# Patient Record
Sex: FEMALE | Race: WHITE | Employment: PART TIME | URBAN - METROPOLITAN AREA
[De-identification: names, ages, dates, MRNs, and addresses within clinical notes are randomized per-mention and may not be internally consistent; named-entity substitution may affect disease eponyms.]

---

## 2017-05-24 ENCOUNTER — OFFICE VISIT (OUTPATIENT)
Dept: URGENT CARE | Facility: URGENT CARE | Age: 22
End: 2017-05-24
Payer: COMMERCIAL

## 2017-05-24 VITALS
DIASTOLIC BLOOD PRESSURE: 76 MMHG | HEART RATE: 76 BPM | TEMPERATURE: 98.6 F | SYSTOLIC BLOOD PRESSURE: 126 MMHG | BODY MASS INDEX: 33.61 KG/M2 | WEIGHT: 241 LBS

## 2017-05-24 DIAGNOSIS — H61.23 BILATERAL IMPACTED CERUMEN: Primary | ICD-10-CM

## 2017-05-24 PROCEDURE — 99213 OFFICE O/P EST LOW 20 MIN: CPT | Performed by: PHYSICIAN ASSISTANT

## 2017-05-24 NOTE — MR AVS SNAPSHOT
"              After Visit Summary   2017    Eri Schmitz    MRN: [id number]           Patient Information     Date Of Birth          1995        Visit Information        Provider Department      2017 7:30 PM Susie Evans PA-C [facility]        Today's Diagnoses     Bilateral impacted cerumen    -  1       Follow-ups after your visit        Who to contact     If you have questions or need follow up information about today's clinic visit or your schedule please contact [facility] directly at [phone].  Normal or non-critical lab and imaging results will be communicated to you by MyChart, letter or phone within 4 business days after the clinic has received the results. If you do not hear from us within 7 days, please contact the clinic through [employer] or phone. If you have a critical or abnormal lab result, we will notify you by phone as soon as possible.  Submit refill requests through [employer] or call your pharmacy and they will forward the refill request to us. Please allow 3 business days for your refill to be completed.          Additional Information About Your Visit        MyChart Information     [employer] lets you send messages to your doctor, view your test results, renew your prescriptions, schedule appointments and more. To sign up, go to [email or web address] . Click on \"Log in\" on the left side of the screen, which will take you to the Welcome page. Then click on \"Sign up Now\" on the right side of the page.     You will be asked to enter the access code listed below, as well as some personal information. Please follow the directions to create your username and password.     Your access code is: [email or web address]  Expires: 2017  8:19 PM     Your access code will  in 90 days. If you need help or a new code, please call your [place] clinic or [phone].        Care EveryWhere ID     This is your Care " EveryWhere ID. This could be used by other organizations to access your [place] medical records  [id number]        Your Vitals Were     Pulse Temperature BMI (Body Mass Index)             76 98.6  F (37  C) (Oral) 33.61 kg/m2          Blood Pressure from Last 3 Encounters:   05/24/17 126/76   08/08/16 148/90   08/03/16 130/82    Weight from Last 3 Encounters:   05/24/17 241 lb (109.3 kg)   08/08/16 211 lb (95.7 kg)   08/03/16 210 lb (95.3 kg)              Today, you had the following     No orders found for display       Primary Care Provider    Marie Renee MD       XXX RETIRED XXX XXX  XXX [place] [address]        Thank you!     Thank you for choosing [facility]  for your care. Our goal is always to provide you with excellent care. Hearing back from our patients is one way we can continue to improve our services. Please take a few minutes to complete the written survey that you may receive in the mail after your visit with us. Thank you!             Your Updated Medication List - Protect others around you: Learn how to safely use, store and throw away your medicines at www.disposemymeds.org.          This list is accurate as of: 5/24/17  8:19 PM.  Always use your most recent med list.                   Brand Name Dispense Instructions for use    ELAVIL PO          ibuprofen 200 MG tablet    ADVIL/MOTRIN    1 tablet    Take 3 tablets by mouth every 8 hours as needed for pain.       IMITREX 50 MG tablet   Generic drug:  SUMAtriptan      Take by mouth at onset of headache for migraine       levonorgestrel 20 MCG/24HR IUD    MIRENA (52 MG)    1 each    1 each (20 mcg) by Intrauterine route once for 1 dose       MULTIVITAMIN PO          venlafaxine 150 MG 24 hr capsule    EFFEXOR-XR     TK 1 C PO QD

## 2017-05-25 NOTE — NURSING NOTE
"Chief Complaint   Patient presents with     Ear Problem     rt ear plugged for 3 days       Initial /76 (BP Location: Right arm, Patient Position: Chair, Cuff Size: Adult Large)  Pulse 76  Temp 98.6  F (37  C) (Oral)  Wt 241 lb (109.3 kg)  BMI 33.61 kg/m2 Estimated body mass index is 33.61 kg/(m^2) as calculated from the following:    Height as of 8/8/16: 5' 11\" (1.803 m).    Weight as of this encounter: 241 lb (109.3 kg).  Medication Reconciliation: complete   Boby GIFFORD    "

## 2017-05-25 NOTE — PROGRESS NOTES
SUBJECTIVE:  Eri Schmitz is a 22 year old female who presents with bilateral ear fullness for 3 day(s).   Severity: moderate   Timing:gradual onset  Additional symptoms include none.      History of recurrent otitis: no    Past Medical History:   Diagnosis Date     Depression, major, recurrent, moderate (H)     psych, effexor.      Dysthymia     self managed well with exercise, seeing friends. Started Effexor 7/2013. PMD     Migraines     No aura. Improved with ocps. amitriptyline for suppression.      Current Outpatient Prescriptions   Medication Sig Dispense Refill     venlafaxine (EFFEXOR-XR) 150 MG 24 hr capsule TK 1 C PO QD  3     levonorgestrel (MIRENA, 52 MG,) 20 MCG/24HR IUD 1 each (20 mcg) by Intrauterine route once for 1 dose 1 each 0     SUMAtriptan (IMITREX) 50 MG tablet Take by mouth at onset of headache for migraine       Amitriptyline HCl (ELAVIL PO)        Multiple Vitamins-Minerals (MULTIVITAMIN PO)        ibuprofen (ADVIL,MOTRIN) 200 MG tablet Take 3 tablets by mouth every 8 hours as needed for pain. (Patient not taking: Reported on 5/24/2017) 1 tablet 0     Social History   Substance Use Topics     Smoking status: Never Smoker     Smokeless tobacco: Never Used     Alcohol use Not on file       ROS:   CONSTITUTIONAL:NEGATIVE for fever, chills, change in weight  INTEGUMENTARY/SKIN: NEGATIVE for worrisome rashes, moles or lesions  ENT/MOUTH: as above    OBJECTIVE:  /76 (BP Location: Right arm, Patient Position: Chair, Cuff Size: Adult Large)  Pulse 76  Temp 98.6  F (37  C) (Oral)  Wt 241 lb (109.3 kg)  BMI 33.61 kg/m2   EXAM:  Initially both ear canals are blocked with cerumen.    After lavage by nursing the exam is as follows:  The right TM is normal: no effusions, no erythema, and normal landmarks     The right auditory canal is normal and without drainage, edema or erythema  The left TM is normal: no effusions, no erythema, and normal landmarks  The left auditory canal is normal and  without drainage, edema or erythema  SKIN: no rashes or lesions    (H61.23) Bilateral impacted cerumen  (primary encounter diagnosis)  Comment:   Plan: resolved with lavage by nursing.

## 2017-12-27 ENCOUNTER — TRANSFERRED RECORDS (OUTPATIENT)
Dept: HEALTH INFORMATION MANAGEMENT | Facility: CLINIC | Age: 22
End: 2017-12-27

## 2018-01-03 ENCOUNTER — TRANSFERRED RECORDS (OUTPATIENT)
Dept: HEALTH INFORMATION MANAGEMENT | Facility: CLINIC | Age: 23
End: 2018-01-03

## 2018-01-09 ENCOUNTER — MEDICAL CORRESPONDENCE (OUTPATIENT)
Dept: HEALTH INFORMATION MANAGEMENT | Facility: CLINIC | Age: 23
End: 2018-01-09

## 2018-01-31 ENCOUNTER — OFFICE VISIT (OUTPATIENT)
Dept: OPHTHALMOLOGY | Facility: CLINIC | Age: 23
End: 2018-01-31
Attending: OPHTHALMOLOGY
Payer: COMMERCIAL

## 2018-01-31 DIAGNOSIS — H47.333 PSEUDOPAPILLEDEMA OF BOTH OPTIC DISCS: Primary | ICD-10-CM

## 2018-01-31 PROCEDURE — 92083 EXTENDED VISUAL FIELD XM: CPT | Mod: ZF | Performed by: OPHTHALMOLOGY

## 2018-01-31 PROCEDURE — G0463 HOSPITAL OUTPT CLINIC VISIT: HCPCS | Mod: ZF

## 2018-01-31 ASSESSMENT — SLIT LAMP EXAM - LIDS
COMMENTS: NORMAL
COMMENTS: NORMAL

## 2018-01-31 ASSESSMENT — TONOMETRY
OD_IOP_MMHG: 15
OS_IOP_MMHG: 17
IOP_METHOD: ICARE

## 2018-01-31 ASSESSMENT — VISUAL ACUITY
OS_CC: 20/20
METHOD: SNELLEN - LINEAR
OD_CC: 20/20
CORRECTION_TYPE: GLASSES

## 2018-01-31 ASSESSMENT — EXTERNAL EXAM - LEFT EYE: OS_EXAM: NORMAL

## 2018-01-31 ASSESSMENT — REFRACTION_WEARINGRX
OD_AXIS: 076
OD_CYLINDER: +0.50
OS_CYLINDER: +0.25
OD_SPHERE: -4.00
OS_SPHERE: -4.25
OS_AXIS: 103

## 2018-01-31 ASSESSMENT — CUP TO DISC RATIO
OS_RATIO: 0.2
OD_RATIO: 0.2

## 2018-01-31 ASSESSMENT — CONF VISUAL FIELD
OS_NORMAL: 1
OD_NORMAL: 1

## 2018-01-31 ASSESSMENT — EXTERNAL EXAM - RIGHT EYE: OD_EXAM: NORMAL

## 2018-01-31 NOTE — LETTER
2018    RE: Eri Schmitz  : 1995  MRN: [id number]    Dear Dr. Najera,    Thank you for referring your patient, Eri Schmitz, to my neuro-ophthalmology clinic recently.  After a thorough neuro-ophthalmic history and examination, I came to the following conclusions:     1. 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  I agree with Dr. Navarrete that no further workup is required at this time and patient may follow-up with Dr. Najera in 1 year for repeat exam:    Eri Schmitz is a pleasant 23 year old White female who presents to my neuro-ophthalmology clinic today for a second opinion on optic disc elevation.     1 month ago, patient was being seen by regular ophthalmologist (Dr. Najera) who noted an elevation in her optic discs bilaterally. He referred her to a retina specialist (Dr. Augustine Navarrete) who was suspicious for congenital drusen but wanted her to see a neuro-ophthalmologist to confirm. Last eye exam 3-4 years prior had showed no abnormal findings.    Eri has not noticed any changes in her vision recently. No new flashes, floaters, shadows, loss of color vision, visual distortions, or deficits in her vision. No pain in her eyes or pain with eye movements. No redness or swelling. No new headaches, weight loss, weight gain, numbness/tingling, weakness, fevers, chills, or other symptoms. She does not recall any other history of optic disc abnormalities.     PMHx: Depression and Anxiety. Migraines.   Surgical: None     Medications: Venlafaxine   FHx: Father and maternal grandmother with cataracts. No family history of ocular disease, ocular surgery, neurological disease, or cancer.    SHx: Gap year before graduate school. Interested in forensic anthropology and  archaeology.      The patient has normal afferent visual function in both eyes including normal best corrected visual acuity, color vision, confrontation visual fields, and pupillary light responses in both eyes.  Slit lamp exam was unremarkable. Dilated fundus exam revealed nasal elevation of the optic nerve head in both eyes.  There was mild blurring of the nasal margin however the retinal vessels were still visible at the nasal margin.  Spontaneous venous pulsations were clearly detected in both eyes with direct ophthalmoscopy. Cranial nerves V1-3, 7,8,9,11, and 12 were normal bilaterally.     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  It is possible that this represents buried optic nerve head drusen or also possible that she simply has anomalous elevated nerves in the absence of drusen.  It is reassuring today that she has no symptoms of increased intracranial pressure and spontaneous venous pulsations present at both optic nerve heads which is a very strong argument against increased intracranial pressure.    I did not make a follow-up appointment, but I would be happy to see the patient back in the future should any new neuro-ophthalmic concern arise.  I recommended that she follow-up with Dr. Najera in the future for her primary eye care.      Again, thank you for trusting me with the care of your patient.  For further exam details, please feel free to contact our office for additional records.  If you wish to contact me regarding this patient please email me at [email or web address] or give my clinic a call to arrange a phone conversation.    Sincerely,    Tarun Christensen MD  , Neuro-Ophthalmology and Adult Strabismus  Department of Ophthalmology and Visual Neurosciences  [facility]    DX: pseudo-papilledema

## 2018-01-31 NOTE — MR AVS SNAPSHOT
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    -  1       Follow-ups after your visit        Who to contact     Please call your clinic at [phone] to:    Ask questions about your health    Make or cancel appointments    Discuss your medicines    Learn about your test results    Speak to your doctor   If you have compliments or concerns about an experience at your clinic, or if you wish to file a complaint, please contact [facility] Patient Relations at [phone] or email us at [email or web address]         Additional Information About Your Visit        MyChart Information     [employer] is an electronic gateway that provides easy, online access to your medical records. With [employer], you can request a clinic appointment, read your test results, renew a prescription or communicate with your care team.     To sign up for [employer] visit the website at [email or web address]   You will be asked to enter the access code listed below, as well as some personal information. Please follow the directions to create your username and password.     Your access code is: [email or web address]  Expires: 2018  6:31 AM     Your access code will  in 90 days. If you need help or a new code, please contact your [facility] or call [phone] for assistance.        Care EveryWhere ID     This is your Care EveryWhere ID. This could be used by other organizations to access your [place] medical records  [id number]         Blood Pressure from Last 3 Encounters:   17 126/76   16 148/90   16 130/82    Weight from Last 3 Encounters:   17  109.3 kg (241 lb)   08/08/16 95.7 kg (211 lb)   08/03/16 95.3 kg (210 lb)              We Performed the Following     Glaucoma Top OU          Today's Medication Changes          These changes are accurate as of 1/31/18 11:59 PM.  If you have any questions, ask your nurse or doctor.               Stop taking these medicines if you haven't already. Please contact your care team if you have questions.     ELAVIL PO   Stopped by:  Tarun Christensen MD           IMITREX 50 MG tablet   Generic drug:  SUMAtriptan   Stopped by:  Tarun Christensen MD                    Primary Care Provider    Marie Renee MD       XXX RETIRED XXX XXX  XXX [place] [address]        Equal Access to Services     [facility]: Hadii guillermo holguin hadhenryo Soadwoa, waaxda luqadaha, qaybta kaalmada adequentinyada, alvaro bliss . So [facility] [phone].    ATENCIÓN: Si habla español, tiene a nicolas disposición servicios gratuitos de asistencia lingüística. Llame al [phone].    We comply with applicable federal civil rights laws and [place] laws. We do not discriminate on the basis of race, color, national origin, age, disability, sex, sexual orientation, or gender identity.            Thank you!     Thank you for choosing [facility]  for your care. Our goal is always to provide you with excellent care. Hearing back from our patients is one way we can continue to improve our services. Please take a few minutes to complete the written survey that you may receive in the mail after your visit with us. Thank you!             Your Updated Medication List - Protect others around you: Learn how to safely use, store and throw away your medicines at www.disposemymeds.org.          This list is accurate as of 1/31/18 11:59 PM.  Always use your most recent med list.                   Brand Name Dispense Instructions for use Diagnosis    ibuprofen 200 MG tablet    ADVIL/MOTRIN    1 tablet    Take 3 tablets by mouth every 8  hours as needed for pain.        levonorgestrel 20 MCG/24HR IUD    MIRENA (52 MG)    1 each    1 each (20 mcg) by Intrauterine route once for 1 dose    Encounter for IUD insertion       MULTIVITAMIN PO           venlafaxine 150 MG 24 hr capsule    EFFEXOR-XR     TK 1 C PO QD

## 2018-01-31 NOTE — PROGRESS NOTES
ATTENDING ASSESSMENT AND PLAN:       1. 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  I agree with Dr. Navarrete that no further workup is required at this time and patient may follow-up with Dr. Najera in 1 year for repeat exam:    Eri Schmitz is a pleasant 23 year old White female who presents to my neuro-ophthalmology clinic today for a second opinion on optic disc elevation.     1 month ago, patient was being seen by regular ophthalmologist (Dr. Najera) who noted an elevation in her optic discs bilaterally. He referred her to a retina specialist (Dr. Augustine Navarrete) who was suspicious for congenital drusen but wanted her to see a neuro-ophthalmologist to confirm. Last eye exam 3-4 years prior had showed no abnormal findings.    Eri has not noticed any changes in her vision recently. No new flashes, floaters, shadows, loss of color vision, visual distortions, or deficits in her vision. No pain in her eyes or pain with eye movements. No redness or swelling. No new headaches, weight loss, weight gain, numbness/tingling, weakness, fevers, chills, or other symptoms. She does not recall any other history of optic disc abnormalities.     PMHx: Depression and Anxiety. Migraines.   Surgical: None     Medications: Venlafaxine   FHx: Father and maternal grandmother with cataracts. No family history of ocular disease, ocular surgery, neurological disease, or cancer.    SHx: Gap year before graduate school. Interested in forensic anthropology and archaeology.      The patient has normal afferent visual function in both eyes including normal best corrected visual acuity, color vision, confrontation visual fields, and pupillary light responses in both eyes.  Slit lamp exam was unremarkable. Dilated fundus  exam revealed nasal elevation of the optic nerve head in both eyes.  There was mild blurring of the nasal margin however the retinal vessels were still visible at the nasal margin.  Spontaneous venous pulsations were clearly detected in both eyes with direct ophthalmoscopy. Cranial nerves V1-3, 7,8,9,11, and 12 were normal bilaterally.     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  It is possible that this represents buried optic nerve head drusen or also possible that she simply has anomalous elevated nerves in the absence of drusen.  It is reassuring today that she has no symptoms of increased intracranial pressure and spontaneous venous pulsations present at both optic nerve heads which is a very strong argument against increased intracranial pressure.    I did not make a follow-up appointment, but I would be happy to see the patient back in the future should any new neuro-ophthalmic concern arise.  I recommended that she follow-up with Dr. Najera in the future for her primary eye care.         Complete documentation of historical and exam elements from today's encounter can be found in the full encounter summary report (not reduplicated in this progress note).  I personally obtained the chief complaint(s) and history of present illness.  I confirmed and edited as necessary the review of systems, past medical/surgical history, family history, social history, and examination findings as documented by others; and I examined the patient myself.  I personally reviewed the relevant tests, images, and reports as documented above.  I formulated and edited as necessary the assessment and plan and discussed the findings and management plan with the patient and family   Tarun Christensen MD

## 2018-01-31 NOTE — NURSING NOTE
Chief Complaints and History of Present Illnesses   Patient presents with     Neurologic Problem     elevated optic disc     HPI    Symptoms:              Comments:  Eri is a 23 year old female referred to rule out papilledema vs. buried optic nerve drusen.     No ocular symptoms  No headaches  No tinnitus  No vision concerns  Current Rx 1 month old.     Cecy VELA 1:45 PM January 31, 2018

## 2018-02-15 ENCOUNTER — OFFICE VISIT (OUTPATIENT)
Dept: URGENT CARE | Facility: URGENT CARE | Age: 23
End: 2018-02-15
Payer: COMMERCIAL

## 2018-02-15 VITALS
TEMPERATURE: 99 F | BODY MASS INDEX: 26.6 KG/M2 | WEIGHT: 190 LBS | DIASTOLIC BLOOD PRESSURE: 82 MMHG | SYSTOLIC BLOOD PRESSURE: 140 MMHG | HEART RATE: 100 BPM | HEIGHT: 71 IN

## 2018-02-15 DIAGNOSIS — H61.23 EXCESSIVE EAR WAX, BILATERAL: Primary | ICD-10-CM

## 2018-02-15 PROCEDURE — 69209 REMOVE IMPACTED EAR WAX UNI: CPT | Mod: 50 | Performed by: FAMILY MEDICINE

## 2018-02-15 NOTE — MR AVS SNAPSHOT
After Visit Summary   2/15/2018    Eri Schmitz    MRN: [id number]           Patient Information     Date Of Birth          1995        Visit Information        Provider Department      2/15/2018 6:55 PM Danielle Giang MD [facility]        Today's Diagnoses     Excessive ear wax, bilateral    -  1      Care Instructions      Earwax Removal    The ear canal makes earwax from the canal s lining. The ears make wax to lubricate and protect the ear canal. The ear canal is the tube that connects the middle ear to the outside of the ear. The wax protects the ear from bacteria, infection, and damage from water or trauma.  The wax that forms in the canal naturally moves toward the outside of the ear and falls out. In some cases, the ear may make too much wax. If the wax causes problems or keeps the healthcare provider from seeing into the ear, the extra wax may be removed.  Too much wax can affect your hearing. It can cause itching. In rare cases, it can be painful. Earwax should not be removed unless it is causing a problem. You should not stick objects into your ear to remove wax unless told to do so by your healthcare provider.  Healthcare providers can remove earwax safely. It is important to stay still during the procedure to avoid damage to the ear canal. But removing earwax generally doesn t hurt. You will not usually need anesthesia or pain medicine when the provider removes the earwax.  A number of conditions lead to earwax buildup. These include some skin problems, a narrow ear canal, or ears that make too much earwax. Using cotton swabs in the canal pushes earwax deeper into the ear and contributes to the buildup of earwax.  Home care    The healthcare provider may recommend mineral oil or an over-the-counter eardrop to use at home to soften the earwax. Use these products only if the provider recommends them. Carefully follow the instructions  given.    Don t use mineral oil or OTC eardrops if you might have an ear infection or a ruptured eardrum. Tell your healthcare provider right away if you have diabetes or an immune disorder.    Don t use cotton swabs in your ears. Cotton swabs may push wax deeper into the ear canal or damage the eardrum. Use cotton gauze or a wet washcloth  to gently remove wax on the outside of the ear and around the opening to the ear canal.    Don't use any probing device or object such as cotton-tipped swabs or sonia pins to clean the inside of your ears.    Don t use ear candles to clean your ears. Candling can be dangerous. It can burn the ear canal. It can also make the condition worse instead of better.    Don t use cold water to rinse the ear. This will make you dizzy. If your provider tells you to rinse your ear, use only warm water or follow his or her instructions.    Check the ear for signs of infection or irritation listed below under When to seek medical advice.  Steps for using eardrops  1. Warm the medicine bottle by rubbing it between your hands for a few minutes.  2. Lie down on your side, with the affected ear up.  3. Place the recommended number of drops in the ear. Wet a cotton ball with the medicine. Gently put the cotton ball into the ear opening.  Follow-up care  Follow up with your healthcare provider, or as directed.  When to seek medical advice  Call the provider right away if you have:    Ear pain that gets worse    Fever of 100.4F F (38 C) or higher, or as directed by your healthcare provider    Worsening wax buildup    Severe pain, dizziness, or nausea    Bleeding from the ear    Hearing problems    Signs of irritation from the eardrops, such as burning, stinging, or swelling and tenderness    Foul-smelling fluid draining from the ear    Swelling, redness, or tenderness of the outer ear    Headache, neck pain, or stiff neck  Date Last Reviewed: 6/1/2017 2000-2017 [employer] [address]  "[place] [address]. All rights reserved. This information is not intended as a substitute for professional medical care. Always follow your healthcare professional's instructions.                Follow-ups after your visit        Follow-up notes from your care team     Return if symptoms worsen or fail to improve.      Who to contact     If you have questions or need follow up information about today's clinic visit or your schedule please contact [facility] directly at [phone].  Normal or non-critical lab and imaging results will be communicated to you by MyChart, letter or phone within 4 business days after the clinic has received the results. If you do not hear from us within 7 days, please contact the clinic through FONU2hart or phone. If you have a critical or abnormal lab result, we will notify you by phone as soon as possible.  Submit refill requests through Craneware or call your pharmacy and they will forward the refill request to us. Please allow 3 business days for your refill to be completed.          Additional Information About Your Visit        FONU2hart Information     Craneware lets you send messages to your doctor, view your test results, renew your prescriptions, schedule appointments and more. To sign up, go to [email or web address] . Click on \"Log in\" on the left side of the screen, which will take you to the Welcome page. Then click on \"Sign up Now\" on the right side of the page.     You will be asked to enter the access code listed below, as well as some personal information. Please follow the directions to create your username and password.     Your access code is: [email or web address]  Expires: 2018  6:31 AM     Your access code will  in 90 days. If you need help or a new code, please call your [facility] or [phone].        Care EveryWhere ID     This is your Care EveryWhere ID. This could be used by other organizations to access your " "[place] medical records  [id number]        Your Vitals Were     Pulse Temperature Height BMI (Body Mass Index)          100 99  F (37.2  C) 5' 11\" (1.803 m) 26.5 kg/m2         Blood Pressure from Last 3 Encounters:   02/15/18 140/82   05/24/17 126/76   08/08/16 148/90    Weight from Last 3 Encounters:   02/15/18 190 lb (86.2 kg)   05/24/17 241 lb (109.3 kg)   08/08/16 211 lb (95.7 kg)              We Performed the Following     HC REMOVE IMPACTED CERUMEN, BILATERAL        Primary Care Provider Fax #    Provider Not In System [phone]                Equal Access to Services     WESLEY TOSCANO : Davidson Brush, donna mccormick, briseyda neff, alvaro bliss . So [facility] [phone].    ATENCIÓN: Si habla español, tiene a nicolas disposición servicios gratuitos de asistencia lingüística. Llame al [phone].    We comply with applicable federal civil rights laws and [place] laws. We do not discriminate on the basis of race, color, national origin, age, disability, sex, sexual orientation, or gender identity.            Thank you!     Thank you for choosing [facility]  for your care. Our goal is always to provide you with excellent care. Hearing back from our patients is one way we can continue to improve our services. Please take a few minutes to complete the written survey that you may receive in the mail after your visit with us. Thank you!             Your Updated Medication List - Protect others around you: Learn how to safely use, store and throw away your medicines at www.disposemymeds.org.          This list is accurate as of 2/15/18  7:53 PM.  Always use your most recent med list.                   Brand Name Dispense Instructions for use Diagnosis    ibuprofen 200 MG tablet    ADVIL/MOTRIN    1 tablet    Take 3 tablets by mouth every 8 hours as needed for pain.        levonorgestrel 20 MCG/24HR IUD    MIRENA (52 MG)    1 each    1 " each (20 mcg) by Intrauterine route once for 1 dose    Encounter for IUD insertion       MULTIVITAMIN PO           venlafaxine 150 MG 24 hr capsule    EFFEXOR-XR     TK 1 C PO QD

## 2018-02-16 NOTE — PATIENT INSTRUCTIONS
Earwax Removal    The ear canal makes earwax from the canal s lining. The ears make wax to lubricate and protect the ear canal. The ear canal is the tube that connects the middle ear to the outside of the ear. The wax protects the ear from bacteria, infection, and damage from water or trauma.  The wax that forms in the canal naturally moves toward the outside of the ear and falls out. In some cases, the ear may make too much wax. If the wax causes problems or keeps the healthcare provider from seeing into the ear, the extra wax may be removed.  Too much wax can affect your hearing. It can cause itching. In rare cases, it can be painful. Earwax should not be removed unless it is causing a problem. You should not stick objects into your ear to remove wax unless told to do so by your healthcare provider.  Healthcare providers can remove earwax safely. It is important to stay still during the procedure to avoid damage to the ear canal. But removing earwax generally doesn t hurt. You will not usually need anesthesia or pain medicine when the provider removes the earwax.  A number of conditions lead to earwax buildup. These include some skin problems, a narrow ear canal, or ears that make too much earwax. Using cotton swabs in the canal pushes earwax deeper into the ear and contributes to the buildup of earwax.  Home care    The healthcare provider may recommend mineral oil or an over-the-counter eardrop to use at home to soften the earwax. Use these products only if the provider recommends them. Carefully follow the instructions given.    Don t use mineral oil or OTC eardrops if you might have an ear infection or a ruptured eardrum. Tell your healthcare provider right away if you have diabetes or an immune disorder.    Don t use cotton swabs in your ears. Cotton swabs may push wax deeper into the ear canal or damage the eardrum. Use cotton gauze or a wet washcloth  to gently remove wax on the outside of the ear and  around the opening to the ear canal.    Don't use any probing device or object such as cotton-tipped swabs or sonia pins to clean the inside of your ears.    Don t use ear candles to clean your ears. Candling can be dangerous. It can burn the ear canal. It can also make the condition worse instead of better.    Don t use cold water to rinse the ear. This will make you dizzy. If your provider tells you to rinse your ear, use only warm water or follow his or her instructions.    Check the ear for signs of infection or irritation listed below under When to seek medical advice.  Steps for using eardrops  1. Warm the medicine bottle by rubbing it between your hands for a few minutes.  2. Lie down on your side, with the affected ear up.  3. Place the recommended number of drops in the ear. Wet a cotton ball with the medicine. Gently put the cotton ball into the ear opening.  Follow-up care  Follow up with your healthcare provider, or as directed.  When to seek medical advice  Call the provider right away if you have:    Ear pain that gets worse    Fever of 100.4F F (38 C) or higher, or as directed by your healthcare provider    Worsening wax buildup    Severe pain, dizziness, or nausea    Bleeding from the ear    Hearing problems    Signs of irritation from the eardrops, such as burning, stinging, or swelling and tenderness    Foul-smelling fluid draining from the ear    Swelling, redness, or tenderness of the outer ear    Headache, neck pain, or stiff neck  Date Last Reviewed: 6/1/2017 2000-2017 [employer]. [address] [place] [address], [place] [address]. All rights reserved. This information is not intended as a substitute for professional medical care. Always follow your healthcare professional's instructions.

## 2018-02-16 NOTE — PROGRESS NOTES
"SUBJECTIVE:  Eri Schmitz is a 23 year old with R>L waxy ears and here for ear wash. Feels ears are plugged up.     OBJECTIVE:  /82  Pulse 100  Temp 99  F (37.2  C)  Ht 5' 11\" (1.803 m)  Wt 190 lb (86.2 kg)  BMI 26.5 kg/m2  General appearance: alert and cooperative.    Ears: abnormal: R TM not visualized secondary to cerumen; L TM not visualized secondary to cerumen.  I tried to remove some wax from the right ear and some did come out with ear curette but not fully.     ASSESSMENT:  Bilateral ear wax impaction s/p ear lavage    PLAN: Ear check by nurse and all wax was out. Call or return to clinic prn if these symptoms fail to improve as anticipated.  Danielle Gusman MD   "

## 2018-02-16 NOTE — NURSING NOTE
"Chief Complaint   Patient presents with     Urgent Care     Ent Problem       Initial /82  Pulse 100  Temp 99  F (37.2  C)  Ht 5' 11\" (1.803 m)  Wt 190 lb (86.2 kg)  BMI 26.5 kg/m2 Estimated body mass index is 26.5 kg/(m^2) as calculated from the following:    Height as of this encounter: 5' 11\" (1.803 m).    Weight as of this encounter: 190 lb (86.2 kg).  Medication Reconciliation: complete    "

## 2018-06-12 ENCOUNTER — OFFICE VISIT (OUTPATIENT)
Dept: URGENT CARE | Facility: URGENT CARE | Age: 23
End: 2018-06-12
Payer: COMMERCIAL

## 2018-06-12 VITALS
SYSTOLIC BLOOD PRESSURE: 111 MMHG | HEART RATE: 70 BPM | TEMPERATURE: 97.6 F | DIASTOLIC BLOOD PRESSURE: 73 MMHG | WEIGHT: 243.3 LBS | OXYGEN SATURATION: 95 % | BODY MASS INDEX: 33.93 KG/M2

## 2018-06-12 DIAGNOSIS — H61.23 BILATERAL IMPACTED CERUMEN: Primary | ICD-10-CM

## 2018-06-12 DIAGNOSIS — H65.192 OTHER ACUTE NONSUPPURATIVE OTITIS MEDIA OF LEFT EAR, RECURRENCE NOT SPECIFIED: ICD-10-CM

## 2018-06-12 PROCEDURE — 99213 OFFICE O/P EST LOW 20 MIN: CPT | Mod: 25 | Performed by: PHYSICIAN ASSISTANT

## 2018-06-12 PROCEDURE — 69210 REMOVE IMPACTED EAR WAX UNI: CPT | Mod: 50 | Performed by: PHYSICIAN ASSISTANT

## 2018-06-12 RX ORDER — AMOXICILLIN 500 MG/1
500 CAPSULE ORAL 2 TIMES DAILY
Qty: 20 CAPSULE | Refills: 0 | Status: SHIPPED | OUTPATIENT
Start: 2018-06-12 | End: 2018-06-22

## 2018-06-12 ASSESSMENT — ENCOUNTER SYMPTOMS
NAUSEA: 0
ABDOMINAL PAIN: 0
FEVER: 0
COUGH: 0
DIARRHEA: 0
HEADACHES: 0
CHILLS: 0
VOMITING: 0
SHORTNESS OF BREATH: 0
FOCAL WEAKNESS: 0

## 2018-06-12 ASSESSMENT — PAIN SCALES - GENERAL: PAINLEVEL: MILD PAIN (2)

## 2018-06-12 NOTE — PROGRESS NOTES
HPI  June 12, 2018    HPI: Eri Schmitz is a 23 year old female who complains of moderate L ear pain & fullness/decreased hearing onset yesterday. Symptoms are constant in duration. No treatments tried. Denies cough, congestion, ear drainage, fever/chills, HA, CP, SOB, abd pain, N/V/D, rash, or any other symptoms.     Past Medical History:   Diagnosis Date     Depression, major, recurrent, moderate (H)     psych, effexor.      Dysthymia     self managed well with exercise, seeing friends. Started Effexor 7/2013. PMD     Migraines     No aura. Improved with ocps. amitriptyline for suppression.      Past Surgical History:   Procedure Laterality Date     NO HISTORY OF SURGERY       Social History   Substance Use Topics     Smoking status: Never Smoker     Smokeless tobacco: Never Used     Alcohol use Not on file     Patient Active Problem List   Diagnosis     Depression, major, recurrent, moderate (H)     Intractable migraine without aura and with status migrainosus     Family History   Problem Relation Age of Onset     DIABETES Mother      DIABETES Maternal Grandfather      Lung Cancer Paternal Grandmother         Problem list, Medication list, Allergies, and Medical/Social/Surgical histories reviewed in [facility] and updated as appropriate.      Review of Systems   Constitutional: Negative for chills and fever.   HENT: Positive for ear pain. Negative for congestion and ear discharge.    Respiratory: Negative for cough and shortness of breath.    Cardiovascular: Negative for chest pain.   Gastrointestinal: Negative for abdominal pain, diarrhea, nausea and vomiting.   Skin: Negative for rash.   Neurological: Negative for focal weakness and headaches.   All other systems reviewed and are negative.        Physical Exam   Constitutional: She is oriented to person, place, and time and well-developed, well-nourished, and in no distress.   HENT:   Head: Normocephalic and atraumatic.   Left Ear: No drainage.   Nose: Nose  normal.   Mouth/Throat: Uvula is midline, oropharynx is clear and moist and mucous membranes are normal.   Exam before irrigation: cerumen impaction bilaterally, unable to visualize TMs    Exam after irrigation: cerumen impaction resolved bilaterally, right TM normal, left TM erythematous & bulging   Cardiovascular: Normal rate, regular rhythm and normal heart sounds.    Pulmonary/Chest: Effort normal and breath sounds normal.   Musculoskeletal: Normal range of motion.   Neurological: She is alert and oriented to person, place, and time. Gait normal.   Skin: Skin is warm and dry.   Nursing note and vitals reviewed.    Vital Signs  /73 (BP Location: Left arm, Patient Position: Sitting, Cuff Size: Adult Large)  Pulse 70  Temp 97.6  F (36.4  C) (Tympanic)  Wt 243 lb 4.8 oz (110.4 kg)  SpO2 95%  BMI 33.93 kg/m2     Diagnostic Test Results:  none     ASSESSMENT/PLAN      ICD-10-CM    1. Bilateral impacted cerumen H61.23 REMOVE IMPACTED CERUMEN   2. Other acute nonsuppurative otitis media of left ear, recurrence not specified H65.192 amoxicillin (AMOXIL) 500 MG capsule      Irrigation performed by MA to resolve cerumen impaction.  Pt with left AOM- Rx amoxicillin. Ibuprofen/Tylenol PRN for pain.      I have discussed any lab or imaging results, the patient's diagnosis, and my plan of treatment with the patient and/or family. Patient is aware to come back in if with worsening symptoms or if no relief despite treatment plan.  Patient voiced understanding and had no further questions.       Follow Up: Data Unavailable    VINNIE Otoole, PA-C  [facility]

## 2018-06-12 NOTE — MR AVS SNAPSHOT
"              After Visit Summary   6/12/2018    Eri Schmitz    MRN: [id number]           Patient Information     Date Of Birth          1995        Visit Information        Provider Department      6/12/2018 5:45 PM Naima Cleary PA-C [facility]        Today's Diagnoses     Bilateral impacted cerumen    -  1    Other acute nonsuppurative otitis media of left ear, recurrence not specified           Follow-ups after your visit        Follow-up notes from your care team     Return if symptoms worsen or fail to improve.      Who to contact     If you have questions or need follow up information about today's clinic visit or your schedule please contact [facility] directly at [phone].  Normal or non-critical lab and imaging results will be communicated to you by ResoServhart, letter or phone within 4 business days after the clinic has received the results. If you do not hear from us within 7 days, please contact the clinic through ResoServhart or phone. If you have a critical or abnormal lab result, we will notify you by phone as soon as possible.  Submit refill requests through BloomBoard or call your pharmacy and they will forward the refill request to us. Please allow 3 business days for your refill to be completed.          Additional Information About Your Visit        ResoServhart Information     BloomBoard lets you send messages to your doctor, view your test results, renew your prescriptions, schedule appointments and more. To sign up, go to [email or web address] . Click on \"Log in\" on the left side of the screen, which will take you to the Welcome page. Then click on \"Sign up Now\" on the right side of the page.     You will be asked to enter the access code listed below, as well as some personal information. Please follow the directions to create your username and password.     Your access code is: [email or web address]  Expires: 9/10/2018  6:59 PM     Your access " code will  in 90 days. If you need help or a new code, please call your [place] clinic or [phone].        Care EveryWhere ID     This is your Care EveryWhere ID. This could be used by other organizations to access your [place] medical records  [id number]        Your Vitals Were     Pulse Temperature Pulse Oximetry BMI (Body Mass Index)          70 97.6  F (36.4  C) (Tympanic) 95% 33.93 kg/m2         Blood Pressure from Last 3 Encounters:   18 111/73   02/15/18 140/82   17 126/76    Weight from Last 3 Encounters:   18 243 lb 4.8 oz (110.4 kg)   02/15/18 190 lb (86.2 kg)   17 241 lb (109.3 kg)              We Performed the Following     REMOVE IMPACTED CERUMEN          Today's Medication Changes          These changes are accurate as of 18  6:59 PM.  If you have any questions, ask your nurse or doctor.               Start taking these medicines.        Dose/Directions    amoxicillin 500 MG capsule   Commonly known as:  AMOXIL   Used for:  Other acute nonsuppurative otitis media of left ear, recurrence not specified   Started by:  Naima Cleary PA-C        Dose:  500 mg   Take 1 capsule (500 mg) by mouth 2 times daily for 10 days   Quantity:  20 capsule   Refills:  0            Where to get your medicines      These medications were sent to [facility] [address] [place] [address] [address]    Hours:  24-hours Phone:  [phone]     amoxicillin 500 MG capsule                Primary Care Provider Fax #    Provider Not In System [phone]                Equal Access to Services     [facility] DORCAS AH: Hadii guillermo Brush, waninoda luamandaadaha, qaybta kaalmaalvaro rothman. So [facility] [phone].    ATENCIÓN: Si habla español, tiene a nicolas disposición servicios gratuitos de asistencia lingüística. Llame al [phone].    We comply with applicable  federal civil rights laws and [place] laws. We do not discriminate on the basis of race, color, national origin, age, disability, sex, sexual orientation, or gender identity.            Thank you!     Thank you for choosing [facility]  for your care. Our goal is always to provide you with excellent care. Hearing back from our patients is one way we can continue to improve our services. Please take a few minutes to complete the written survey that you may receive in the mail after your visit with us. Thank you!             Your Updated Medication List - Protect others around you: Learn how to safely use, store and throw away your medicines at www.disposemymeds.org.          This list is accurate as of 6/12/18  6:59 PM.  Always use your most recent med list.                   Brand Name Dispense Instructions for use Diagnosis    amoxicillin 500 MG capsule    AMOXIL    20 capsule    Take 1 capsule (500 mg) by mouth 2 times daily for 10 days    Other acute nonsuppurative otitis media of left ear, recurrence not specified       ibuprofen 200 MG tablet    ADVIL/MOTRIN    1 tablet    Take 3 tablets by mouth every 8 hours as needed for pain.        * levonorgestrel 20 MCG/24HR IUD    MIRENA     1 each by Intrauterine route once        * levonorgestrel 20 MCG/24HR IUD    MIRENA (52 MG)    1 each    1 each (20 mcg) by Intrauterine route once for 1 dose    Encounter for IUD insertion       MULTIVITAMIN PO           venlafaxine 150 MG 24 hr capsule    EFFEXOR-XR     TK 1 C PO QD        * Notice:  This list has 2 medication(s) that are the same as other medications prescribed for you. Read the directions carefully, and ask your doctor or other care provider to review them with you.

## 2019-08-21 ENCOUNTER — OFFICE VISIT (OUTPATIENT)
Dept: URGENT CARE | Facility: URGENT CARE | Age: 24
End: 2019-08-21
Payer: COMMERCIAL

## 2019-08-21 VITALS
HEART RATE: 88 BPM | BODY MASS INDEX: 29.29 KG/M2 | TEMPERATURE: 99 F | SYSTOLIC BLOOD PRESSURE: 109 MMHG | DIASTOLIC BLOOD PRESSURE: 75 MMHG | OXYGEN SATURATION: 100 % | WEIGHT: 210 LBS

## 2019-08-21 DIAGNOSIS — H60.391 INFECTIVE OTITIS EXTERNA, RIGHT: ICD-10-CM

## 2019-08-21 DIAGNOSIS — H61.23 BILATERAL IMPACTED CERUMEN: Primary | ICD-10-CM

## 2019-08-21 PROCEDURE — 99213 OFFICE O/P EST LOW 20 MIN: CPT | Mod: 25 | Performed by: FAMILY MEDICINE

## 2019-08-21 PROCEDURE — 69209 REMOVE IMPACTED EAR WAX UNI: CPT | Mod: 50 | Performed by: FAMILY MEDICINE

## 2019-08-21 RX ORDER — OFLOXACIN 3 MG/ML
5 SOLUTION AURICULAR (OTIC) 2 TIMES DAILY
Qty: 4 ML | Refills: 0 | Status: SHIPPED | OUTPATIENT
Start: 2019-08-21 | End: 2019-08-28

## 2019-08-22 NOTE — PATIENT INSTRUCTIONS
Patient Education     Impacted Earwax     Inner ear structures including ear canal and eardrum.     Impacted earwax is a buildup of the natural wax in the ear (cerumen). Impacted earwax is very common. It can cause symptoms such as hearing loss. It can also make it difficult for a doctor to examine your ear.  Understanding earwax  Tiny glands in your ear make substances that combine with dead skin cells to form earwax. Earwax helps protect your ear canal from water, dirt, infection, and injury. Over time, earwax travels from the inner part of your ear canal to the entrance of the canal. Then it falls away naturally. But in some cases, it can t travel to the entrance of the canal. This may be because of a health condition or objects put in the ear. With age, earwax tends to become harder and less fluid. Older adults are more likely to have problems with earwax buildup.  What causes impacted earwax?  Earwax can build up because of many health conditions. Some cause a physical blockage. Others cause too much earwax to be made. Health conditions that can cause earwax buildup include:    Use of cotton swabs to clean deep in the ear canal    Bony blockage in the ear (osteoma or exostoses)    Infections, such as  infection of the outer ear (external otitis)    Skin disease, such as eczema    Autoimmune diseases, such as lupus    A narrowed ear canal from birth, chronic inflammation, or injury    Too much earwax because of injury    Too much earwax because of  water in the ear canal  Objects repeatedly placed in the ear can also cause impacted earwax. For example, putting cotton swabs in the ear may push the wax deeper into the ear. Over time, this may cause blockage. Hearing aids, swimming plugs, and swim molds can cause the same problem when used again and again.  In some cases, the cause of impacted earwax is not known.  Symptoms of impacted earwax  Excess earwax usually does not cause any symptoms, unless there is a  large amount of buildup. Then it may cause symptoms such as:    Hearing loss    Earache    Sense of ear fullness    Itching in the ear    Odor from the ear    Ear drainage    Dizziness    Ringing in the ears    Cough  Treatment for impacted earwax  If you don t have symptoms, you may not need treatment. Often, the earwax goes away on its own with time. If you have symptoms, you may have one or more treatments such as:    Eardrops to soften the earwax. This helps it leave the ear over time.    Rinsing (irrigation) of the ear canal with water. This is done in a doctor s office.    Removal of the earwax with small tools. This is also done in a doctor s office.  In rare cases, some treatments for earwax removal may cause complications such as:    Infection of the outer ear (otitis external)    Earache    Short-term hearing loss    Dizziness    Water trapped in the ear canal    Hole in the eardrum    Ringing in the ears    Bleeding from the ear  Talk with your healthcare provider about which risks apply most to you.  Don t use these at home  Healthcare providers do not advise use of ear candles or ear vacuum kits. These methods are not shown to work and may cause problems.   Preventing impacted earwax  You may not be able to prevent impacted earwax if you have a health condition that causes it, such as eczema. In other cases, you may be able to prevent earwax buildup by:    Using ear drops once a week    Having routine cleaning of the ear about every 6 months    Not using cotton swabs in the ear  When to call the healthcare provider  Call your healthcare provider if you have symptoms of impacted earwax. Also call right away if you have severe symptoms after earwax removal. These may include bleeding or severe ear pain.   Date Last Reviewed: 5/1/2017 2000-2018 [employer]. [address] [place] [address], [place] [address]. All rights reserved. This information is not intended as a substitute for professional  medical care. Always follow your healthcare professional's instructions.           Patient Education     External Ear Infection (Adult)    External otitis (also called  swimmer s ear ) is an infection in the ear canal. It is often caused by bacteria or fungus. It can occur a few days after water gets trapped in the ear canal (from swimming or bathing). It can also occur after cleaning too deeply in the ear canal with a cotton swab or other object. Sometimes, hair care products get into the ear canal and cause this problem.  Symptoms can include pain, fever, itching, redness, drainage, or swelling of the ear canal. Temporary hearing loss may also occur.  Home care    Do not try to clean the ear canal. This can push pus and bacteria deeper into the canal.    Use prescribed ear drops as directed. These help reduce swelling and fight the infection. If an ear wick was placed in the ear canal, apply drops right onto the end of the wick. The wick will draw the medicine into the ear canal even if it is swollen closed.    A cotton ball may be loosely placed in the outer ear to absorb any drainage.    You may use acetaminophen or ibuprofen to control pain, unless another medicine was prescribed. Note: If you have chronic liver or kidney disease or ever had a stomach ulcer or GI bleeding, talk to your healthcare provider before taking any of these medicines.    Do not allow water to get into your ear when bathing. Also, don't swim until the infection has cleared.  Prevention    Keep your ears dry. This helps lower the risk of infection. Dry your ears with a towel or hair dryer after getting wet. Also, use ear plugs when swimming.    Do not stick any objects in the ear to remove wax.    If you feel water trapped in your ear, use ear drops right away. You can get these drops over the counter at most drugstores. They work by removing water from the ear canal.  Follow-up care  Follow up with your healthcare provider in 1 week, or  as advised.  When to seek medical advice  Call your healthcare provider right away if any of these occur:    Ear pain becomes worse or doesn t improve after 3 days of treatment    Redness or swelling of the outer ear occurs or gets worse    Headache    Painful or stiff neck    Drowsiness or confusion    Fever of 100.4 F (38 C) or higher, or as directed by your healthcare provider    Seizure  Date Last Reviewed: 10/1/2017    [id number] [employer]. [address] [place] [address], [place] [address]. All rights reserved. This information is not intended as a substitute for professional medical care. Always follow your healthcare professional's instructions.

## 2019-08-22 NOTE — PROGRESS NOTES
SUBJECTIVE:  Eri Schmitz, a 24 year old female scheduled an appointment to discuss the following issues:     Bilateral impacted cerumen  Infective otitis externa, right    Medical, social, surgical, and family histories reviewed.    Urgent Care    Ear Problem (right ear infection)     Right ear pain and tender for 2 days, dull ache with decreased hearing.  No drainage.  Has not been swimming.      ROS:  See HPI.  No nausea/vomiting.  No fever/chills.  No chest pain/SOB.  No BM/urine problems.  No dizziness or syncope.      OBJECTIVE:  /75   Pulse 88   Temp 99  F (37.2  C) (Oral)   Wt 95.3 kg (210 lb)   SpO2 100%   BMI 29.29 kg/m    EXAM:  GENERAL APPEARANCE:  alert and mild distress; afebrile, moist mucus membrane; no meningeal signs  EYES: Eyes grossly normal to inspection, PERRL and conjunctivae and sclerae normal  HENT: bilateral ear canals filled with brown wet wax; TM obscured; no sinus tenderness;  nose and mouth without ulcers or lesions  NECK: no adenopathy, no asymmetry, masses, or scars and thyroid normal to palpation  RESP: lungs clear to auscultation - no rales, rhonchi or wheezes  CV: regular rates and rhythm, normal S1 S2, no S3 or S4 and no murmur, click or rub  LYMPHATICS: no cervical adenopathy  MS: extremities normal- no gross deformities noted  SKIN: no suspicious lesions or rashes  NEURO: Normal strength and tone, mentation intact and speech normal      ASSESSMENT/PLAN:  (H61.23) Bilateral impacted cerumen  (primary encounter diagnosis)  Comment: bilateral ears wet brown wax removed   Plan: REMOVE IMPACTED CERUMEN        (H60.391) Infective otitis externa, right  Comment: after bilateral ear irrigation, right ear canal appeared red and slightly swollen with slight whitish debris adhere to wall  Plan: ofloxacin (FLOXIN) 0.3 % otic solution  Care instructions given.  Pt to f/up PCP if no improvement or worsening.  Warning signs and symptoms explained.

## 2020-09-19 ENCOUNTER — NURSE TRIAGE (OUTPATIENT)
Dept: NURSING | Facility: CLINIC | Age: 25
End: 2020-09-19

## 2020-09-19 NOTE — TELEPHONE ENCOUNTER
Patient would like to schedule an appointment to establish care.    Was on hold and had to go to work.    Had covid in May and lost her sense of taste and smell.    This still has not returned.    Claribel Waggoner RN    Reason for Disposition    Requesting regular office appointment    Protocols used: INFORMATION ONLY CALL-A-AH

## 2020-09-29 ENCOUNTER — OFFICE VISIT (OUTPATIENT)
Dept: FAMILY MEDICINE | Facility: CLINIC | Age: 25
End: 2020-09-29
Payer: COMMERCIAL

## 2020-09-29 VITALS
WEIGHT: 212 LBS | SYSTOLIC BLOOD PRESSURE: 110 MMHG | HEART RATE: 74 BPM | OXYGEN SATURATION: 96 % | DIASTOLIC BLOOD PRESSURE: 72 MMHG | HEIGHT: 72 IN | BODY MASS INDEX: 28.71 KG/M2 | TEMPERATURE: 97.1 F

## 2020-09-29 DIAGNOSIS — Z00.00 ENCOUNTER FOR PREVENTIVE HEALTH EXAMINATION: Primary | ICD-10-CM

## 2020-09-29 DIAGNOSIS — M22.2X2 PATELLOFEMORAL PAIN SYNDROME OF BOTH KNEES: ICD-10-CM

## 2020-09-29 DIAGNOSIS — M22.2X1 PATELLOFEMORAL PAIN SYNDROME OF BOTH KNEES: ICD-10-CM

## 2020-09-29 LAB
BASOPHILS # BLD AUTO: 0 10E9/L (ref 0–0.2)
BASOPHILS NFR BLD AUTO: 0.3 %
DIFFERENTIAL METHOD BLD: NORMAL
EOSINOPHIL # BLD AUTO: 0.2 10E9/L (ref 0–0.7)
EOSINOPHIL NFR BLD AUTO: 2.8 %
ERYTHROCYTE [DISTWIDTH] IN BLOOD BY AUTOMATED COUNT: 12.5 % (ref 10–15)
HCT VFR BLD AUTO: 40.4 % (ref 35–47)
HGB BLD-MCNC: 14.2 G/DL (ref 11.7–15.7)
LYMPHOCYTES # BLD AUTO: 1.7 10E9/L (ref 0.8–5.3)
LYMPHOCYTES NFR BLD AUTO: 27.2 %
MCH RBC QN AUTO: 32.5 PG (ref 26.5–33)
MCHC RBC AUTO-ENTMCNC: 35.1 G/DL (ref 31.5–36.5)
MCV RBC AUTO: 92 FL (ref 78–100)
MONOCYTES # BLD AUTO: 0.6 10E9/L (ref 0–1.3)
MONOCYTES NFR BLD AUTO: 10.4 %
NEUTROPHILS # BLD AUTO: 3.7 10E9/L (ref 1.6–8.3)
NEUTROPHILS NFR BLD AUTO: 59.3 %
PLATELET # BLD AUTO: 224 10E9/L (ref 150–450)
RBC # BLD AUTO: 4.37 10E12/L (ref 3.8–5.2)
WBC # BLD AUTO: 6.2 10E9/L (ref 4–11)

## 2020-09-29 PROCEDURE — 80050 GENERAL HEALTH PANEL: CPT | Performed by: INTERNAL MEDICINE

## 2020-09-29 PROCEDURE — 99213 OFFICE O/P EST LOW 20 MIN: CPT | Performed by: INTERNAL MEDICINE

## 2020-09-29 PROCEDURE — 36415 COLL VENOUS BLD VENIPUNCTURE: CPT | Performed by: INTERNAL MEDICINE

## 2020-09-29 PROCEDURE — 80061 LIPID PANEL: CPT | Performed by: INTERNAL MEDICINE

## 2020-09-29 ASSESSMENT — MIFFLIN-ST. JEOR: SCORE: 1812.28

## 2020-09-29 ASSESSMENT — PATIENT HEALTH QUESTIONNAIRE - PHQ9: SUM OF ALL RESPONSES TO PHQ QUESTIONS 1-9: 4

## 2020-09-29 NOTE — PATIENT INSTRUCTIONS
He seemed to be doing well.  I suspect that your sense of smell and taste will return within 2 months.  Otherwise your examination looks good.    You have patellofemoral syndrome.  In your case this is due to your kneecap riding medially in the joint.  I would recommend that you strengthen your lateral quadricep to reduce this alignment issue.    I would recommend a trial of naproxen 200 mg at lunch.  Utilize this while you are strengthening your quadricep musculature.    Was a pleasure seeing you see you back in 1 year.    Patient Education     Understanding Patellofemoral Syndrome    Patellofemoral syndrome is a condition that causes pain on the front of the knee. The large bones of the upper and lower leg meet at the knee. This joint also includes a small triangle-shaped bone that rests on top of the leg bones. This is the kneecap (patella). Patellofemoral refers to the patella and the thigh bone (femur). These bones are surrounded by connective tissue and muscles. Patellofemoral pain is believed to come from stress on the tissues of and around the knee.  What causes patellofemoral syndrome?  No single cause for patellofemoral pain has been found. But many things are likely to contribute to this type of knee pain. These include:    Actions that put repeated stress on the knee, such as running and squatting    Overtraining at a sport    Weak hip or thigh muscle    Normal variations in the way body parts fit together    Poor form during activities that stress the knee, such as running    A fall or blow to the knee  Symptoms of patellofemoral syndrome  Pain is a common symptom. It s often on the front of the knee, but can be around the kneecap. Pain can occur at certain times, such as when you are:    Running    Sitting for a long time with your knees bent, such as at a movie    Walking up or down stairs    Squatting  Other symptoms may include:    A feeling of the knee catching or locking    A grinding or crackling  noise in your knee  Treatment for patellofemoral syndrome  Treatment focuses on reducing pain and avoiding further injury. Treatments may include:    Rest your leg. This gives your knee time to recover. You may need to avoid or change the activity that caused the problem, such as not running for a while.    Prescription or over-the-counter pain medicines. These help reduce inflammation, swelling, and pain.    Cold packs. These help reduce pain.    Stretches and other exercises. These can improve balance, flexibility, and strength.    A shoe insert (orthotic). This can make your knee more stable.    Elastic tape or a brace. These can make your knee more stable.    Physical therapy. This may include exercises or other treatments.    Surgery. In rare cases, if other treatments don t relieve symptoms, you may need surgery.  Possible complications of patellofemoral syndrome  If you don t give your knee time to heal, symptoms may return or get worse. Follow your healthcare provider s instructions on resting and treating your knee.  When to call your healthcare provider  Call your healthcare provider right away if you have any of these:    Fever of 100.4 F (38 C) or higher, or as directed    Pain that gets worse    Symptoms that don t get better, or get worse    New symptoms   Date Last Reviewed: 3/10/2016    [id number] [employer]. [address] [place] [address], [place] [address]. All rights reserved. This information is not intended as a substitute for professional medical care. Always follow your healthcare professional's instructions.

## 2020-09-29 NOTE — LETTER
October 1, 2020      Eri Schmitz  [address] [place] [address]        Dear ,    We are writing to inform you of your test results.    Labs are fine.     Resulted Orders   Comprehensive metabolic panel (BMP + Alb, Alk Phos, ALT, AST, Total. Bili, TP)   Result Value Ref Range    Sodium 141 133 - 144 mmol/L    Potassium 4.0 3.4 - 5.3 mmol/L    Chloride 108 94 - 109 mmol/L    Carbon Dioxide 26 20 - 32 mmol/L    Anion Gap 7 3 - 14 mmol/L    Glucose 83 70 - 99 mg/dL    Urea Nitrogen 10 7 - 30 mg/dL    Creatinine 0.89 0.52 - 1.04 mg/dL    GFR Estimate >90 >60 mL/min/[1.73_m2]      Comment:      Non  GFR Calc  Starting 12/18/2018, serum creatinine based estimated GFR (eGFR) will be   calculated using the Chronic Kidney Disease Epidemiology Collaboration   (CKD-EPI) equation.      GFR Estimate If Black >90 >60 mL/min/[1.73_m2]      Comment:       GFR Calc  Starting 12/18/2018, serum creatinine based estimated GFR (eGFR) will be   calculated using the Chronic Kidney Disease Epidemiology Collaboration   (CKD-EPI) equation.      Calcium 8.7 8.5 - 10.1 mg/dL    Bilirubin Total 0.6 0.2 - 1.3 mg/dL    Albumin 4.0 3.4 - 5.0 g/dL    Protein Total 7.1 6.8 - 8.8 g/dL    Alkaline Phosphatase 56 40 - 150 U/L    ALT 18 0 - 50 U/L    AST 11 0 - 45 U/L   CBC with platelets and differential   Result Value Ref Range    WBC 6.2 4.0 - 11.0 10e9/L    RBC Count 4.37 3.8 - 5.2 10e12/L    Hemoglobin 14.2 11.7 - 15.7 g/dL    Hematocrit 40.4 35.0 - 47.0 %    MCV 92 78 - 100 fl    MCH 32.5 26.5 - 33.0 pg    MCHC 35.1 31.5 - 36.5 g/dL    RDW 12.5 10.0 - 15.0 %    Platelet Count 224 150 - 450 10e9/L    % Neutrophils 59.3 %    % Lymphocytes 27.2 %    % Monocytes 10.4 %    % Eosinophils 2.8 %    % Basophils 0.3 %    Absolute Neutrophil 3.7 1.6 - 8.3 10e9/L    Absolute Lymphocytes 1.7 0.8 - 5.3 10e9/L    Absolute Monocytes 0.6 0.0 - 1.3 10e9/L    Absolute Eosinophils 0.2 0.0 - 0.7 10e9/L    Absolute  Basophils 0.0 0.0 - 0.2 10e9/L    Diff Method Automated Method    TSH with free T4 reflex   Result Value Ref Range    TSH 2.11 0.40 - 4.00 mU/L   Lipid panel reflex to direct LDL Fasting   Result Value Ref Range    Cholesterol 134 <200 mg/dL    Triglycerides 54 <150 mg/dL    HDL Cholesterol 53 >49 mg/dL    LDL Cholesterol Calculated 70 <100 mg/dL      Comment:      Desirable:       <100 mg/dl    Non HDL Cholesterol 81 <130 mg/dL       If you have any questions or concerns, please call the clinic at the number listed above.       Sincerely,        Duke Zayas MD/kw

## 2020-09-29 NOTE — PROGRESS NOTES
Subjective     Eri Schmitz is a 25 year old female who presents to clinic today for the following health issues:    HPI 25-year-old female who was diagnosed with COVID-19 in May of this year.  Both her and her mother were afflicted.  Patient's symptoms are primarily high fever, headache, fatigue, and loss of smell and taste.    Patient states that her smell and taste are slowly coming back.  She states that her exertional tolerance has returned to its normal state.  No fevers.    Overall doing well otherwise.  She currently is working in retail.  She is on her feet quite a bit during the day.  She states by the end of the day her knees are hurting.  She states that both her mother and her have knee pain later in the day.    Her father was diagnosed with knee trouble related to anatomy.        Chief Complaint   Patient presents with     Establish Care     Follow Up     Patient had Covid in May and lost her sense of taste and smell. This still has not returned.       New Patient/Transfer of Care  New Patient/Transfer of Care    Review of Systems   Constitutional, HEENT, cardiovascular, pulmonary, GI, , musculoskeletal, neuro, skin, endocrine and psych systems are negative, except as otherwise noted.      Objective    There were no vitals taken for this visit.  There is no height or weight on file to calculate BMI.  Physical Exam   Alert patient no apparent distress extraocular movements intact pupils equally round sclera white nasal turbinates minimally erythematous oropharynx nonerythematous no lymphadenopathy or thyromegaly her neck is supple  Her lungs are clear without wheeze rales or rhonchi  Heart S1-S2 regular rate and rhythm without murmur rub or gallop  Abdomen soft nontender good bowel sounds all the quadrants no pedal splenomegaly is noted  Lower extremities reveal no edema 2+ posterior tibial pulses she has minimal crepitance left knee no joint effusion noted right knee no crepitance no edema no  erythema the Q angle for this patient is quite minimal her patella tends to ride medially both the left and right leg.  Affect and mood appear appropriate.  Stressors include recent COVID and the other factors relating to social and rest this summer.    Results for orders placed or performed in visit on 09/29/20   CBC with platelets and differential     Status: None   Result Value Ref Range    WBC 6.2 4.0 - 11.0 10e9/L    RBC Count 4.37 3.8 - 5.2 10e12/L    Hemoglobin 14.2 11.7 - 15.7 g/dL    Hematocrit 40.4 35.0 - 47.0 %    MCV 92 78 - 100 fl    MCH 32.5 26.5 - 33.0 pg    MCHC 35.1 31.5 - 36.5 g/dL    RDW 12.5 10.0 - 15.0 %    Platelet Count 224 150 - 450 10e9/L    % Neutrophils 59.3 %    % Lymphocytes 27.2 %    % Monocytes 10.4 %    % Eosinophils 2.8 %    % Basophils 0.3 %    Absolute Neutrophil 3.7 1.6 - 8.3 10e9/L    Absolute Lymphocytes 1.7 0.8 - 5.3 10e9/L    Absolute Monocytes 0.6 0.0 - 1.3 10e9/L    Absolute Eosinophils 0.2 0.0 - 0.7 10e9/L    Absolute Basophils 0.0 0.0 - 0.2 10e9/L    Diff Method Automated Method            Assessment & Plan     Eri was seen today for establish care and follow up.    Diagnoses and all orders for this visit:    Encounter for preventive health examination  -     Comprehensive metabolic panel (BMP + Alb, Alk Phos, ALT, AST, Total. Bili, TP)  -     CBC with platelets and differential  -     TSH with free T4 reflex  -     Lipid panel reflex to direct LDL Fasting    Patellofemoral pain syndrome of both knees    Overall patient is doing relatively well.  Recovering from COVID-19.  It would appear that she is at her baseline in regards to exertional tolerance.  No other significant symptoms.    She does have patellofemoral syndrome.  I recommend leg strengthening exercises to work on the lateral quadricep.  This should improve her patellar tracking.  If this is ineffective referral to physical therapy will be made.  No significant erythema or effusion noted on exam  "today.    Utilization of naproxen at lunchtime may reduce some of her late day inflammation.      BMI:   Estimated body mass index is 29.07 kg/m  as calculated from the following:    Height as of this encounter: 1.819 m (5' 11.6\").    Weight as of this encounter: 96.2 kg (212 lb).   Patient is cognizant of the importance of diet and exercise for weight loss.        See Patient Instructions    Return in about 1 year (around 9/29/2021).    Duke Zayas MD  [facility]    "

## 2020-09-30 LAB
ALBUMIN SERPL-MCNC: 4 G/DL (ref 3.4–5)
ALP SERPL-CCNC: 56 U/L (ref 40–150)
ALT SERPL W P-5'-P-CCNC: 18 U/L (ref 0–50)
ANION GAP SERPL CALCULATED.3IONS-SCNC: 7 MMOL/L (ref 3–14)
AST SERPL W P-5'-P-CCNC: 11 U/L (ref 0–45)
BILIRUB SERPL-MCNC: 0.6 MG/DL (ref 0.2–1.3)
BUN SERPL-MCNC: 10 MG/DL (ref 7–30)
CALCIUM SERPL-MCNC: 8.7 MG/DL (ref 8.5–10.1)
CHLORIDE SERPL-SCNC: 108 MMOL/L (ref 94–109)
CHOLEST SERPL-MCNC: 134 MG/DL
CO2 SERPL-SCNC: 26 MMOL/L (ref 20–32)
CREAT SERPL-MCNC: 0.89 MG/DL (ref 0.52–1.04)
GFR SERPL CREATININE-BSD FRML MDRD: >90 ML/MIN/{1.73_M2}
GLUCOSE SERPL-MCNC: 83 MG/DL (ref 70–99)
HDLC SERPL-MCNC: 53 MG/DL
LDLC SERPL CALC-MCNC: 70 MG/DL
NONHDLC SERPL-MCNC: 81 MG/DL
POTASSIUM SERPL-SCNC: 4 MMOL/L (ref 3.4–5.3)
PROT SERPL-MCNC: 7.1 G/DL (ref 6.8–8.8)
SODIUM SERPL-SCNC: 141 MMOL/L (ref 133–144)
TRIGL SERPL-MCNC: 54 MG/DL
TSH SERPL DL<=0.005 MIU/L-ACNC: 2.11 MU/L (ref 0.4–4)